# Patient Record
Sex: FEMALE | Race: WHITE | NOT HISPANIC OR LATINO | Employment: UNEMPLOYED | URBAN - METROPOLITAN AREA
[De-identification: names, ages, dates, MRNs, and addresses within clinical notes are randomized per-mention and may not be internally consistent; named-entity substitution may affect disease eponyms.]

---

## 2023-03-09 LAB
CHLAMYDIA TRACH., AMPLIFIED: NEGATIVE
N. GONORRHEA, AMPLIFIED: NEGATIVE

## 2023-03-10 LAB — URINE CULTURE: NORMAL

## 2023-05-05 LAB
ANTIBODY SCREEN: NORMAL
ERYTHROCYTE DISTRIBUTION WIDTH (RATIO) BY AUTOMATED COUNT: 12.5 % (ref 11.5–14.5)
ERYTHROCYTE MEAN CORPUSCULAR HEMOGLOBIN CONCENTRATION (G/DL) BY AUTOMATED: 32.7 G/DL (ref 32–36)
ERYTHROCYTE MEAN CORPUSCULAR VOLUME (FL) BY AUTOMATED COUNT: 100 FL (ref 80–100)
ERYTHROCYTES (10*6/UL) IN BLOOD BY AUTOMATED COUNT: 3.25 X10E12/L (ref 4–5.2)
GLUCOSE, 1 HR SCREEN, PREG: 117 MG/DL
HEMATOCRIT (%) IN BLOOD BY AUTOMATED COUNT: 32.4 % (ref 36–46)
HEMOGLOBIN (G/DL) IN BLOOD: 10.6 G/DL (ref 12–16)
LEUKOCYTES (10*3/UL) IN BLOOD BY AUTOMATED COUNT: 13.6 X10E9/L (ref 4.4–11.3)
PLATELETS (10*3/UL) IN BLOOD AUTOMATED COUNT: 365 X10E9/L (ref 150–450)
REFLEX ADDED, ANEMIA PANEL: ABNORMAL

## 2023-05-06 LAB
FERRITIN, PREGNANCY: 25 UG/L
IRON (UG/DL) IN SER/PLAS IN PREGNANCY: 78 UG/DL
IRON BINDING CAPACITY (UG/DL) IN PREGNANCY: 411 UG/DL
IRON SATURATION (%) IN PREGNANCY: 19 %

## 2023-05-07 LAB
FOLATE, SERUM, PREGNANCY: 12.7 NG/ML
VITAMIN B12, PREGNANCY: 361 PG/ML

## 2023-07-21 LAB — GROUP B STREP SCREEN: NORMAL

## 2025-05-23 ENCOUNTER — APPOINTMENT (OUTPATIENT)
Dept: OBSTETRICS AND GYNECOLOGY | Facility: CLINIC | Age: 25
End: 2025-05-23
Payer: COMMERCIAL

## 2025-05-23 VITALS — WEIGHT: 188 LBS | SYSTOLIC BLOOD PRESSURE: 118 MMHG | DIASTOLIC BLOOD PRESSURE: 80 MMHG | BODY MASS INDEX: 35.52 KG/M2

## 2025-05-23 DIAGNOSIS — Z34.80 SUPERVISION OF OTHER NORMAL PREGNANCY, ANTEPARTUM (HHS-HCC): Primary | ICD-10-CM

## 2025-05-23 PROBLEM — O16.9 ELEVATED BLOOD PRESSURE AFFECTING PREGNANCY, ANTEPARTUM: Status: RESOLVED | Noted: 2025-05-23 | Resolved: 2025-05-23

## 2025-05-23 PROCEDURE — 76815 OB US LIMITED FETUS(S): CPT | Performed by: OBSTETRICS & GYNECOLOGY

## 2025-05-23 PROCEDURE — 0500F INITIAL PRENATAL CARE VISIT: CPT | Performed by: OBSTETRICS & GYNECOLOGY

## 2025-05-23 ASSESSMENT — ENCOUNTER SYMPTOMS
NEUROLOGICAL NEGATIVE: 1
CONSTITUTIONAL NEGATIVE: 1
PSYCHIATRIC NEGATIVE: 1
CARDIOVASCULAR NEGATIVE: 1
GASTROINTESTINAL NEGATIVE: 1
RESPIRATORY NEGATIVE: 1

## 2025-05-23 NOTE — PROGRESS NOTES
Subjective   Patient ID: Korin Cervantes is a 25 y.o. female who presents for PREG CONF.  HPI    Patient presents for pregnancy confirmation. LMP 1/27/25. Reports positive home pregnancy test early March. Denies bleeding, pelvic pain, nausea/vomiting, change in discharge.    Review of Systems   Constitutional: Negative.    Respiratory: Negative.     Cardiovascular: Negative.    Gastrointestinal: Negative.    Genitourinary: Negative.    Neurological: Negative.    Psychiatric/Behavioral: Negative.         Objective   Visit Vitals  /80   Wt 85.3 kg (188 lb)   LMP 01/27/2025 (Exact Date)   BMI 35.52 kg/m²   OB Status Pregnant   Smoking Status Never   BSA 1.92 m²       Physical Exam  Constitutional:       Appearance: Normal appearance.   Pulmonary:      Effort: Pulmonary effort is normal.   Genitourinary:     Comments: Vulva normal in appearance without discoloration, rashes, lesions. Vagina is negative for blood, discharge, lesions. Uterus is small, mobile, non tender. There is no cervical motion tenderness, adnexal masses/tenderness.  Neurological:      Mental Status: She is alert.   Psychiatric:         Mood and Affect: Mood normal.         Behavior: Behavior normal.       Assessment/Plan   Problem List Items Addressed This Visit    None  Visit Diagnoses         Codes      Supervision of other normal pregnancy, antepartum ([facility])    -  Primary Z34.80    Relevant Orders    US OB limited 1+ fetuses (Completed)    Prenatal Screening Panel    Hemoglobin A1C    Comprehensive Metabolic Panel    Lactate Dehydrogenase - STAT    Uric Acid    Protein, Urine Random          Discussed findings on exam and US  [place] IUP confirmed, biometrics appropriate for LMP  SULEIMAN 11/3/25  Prenatal labs and baseline HELLP labs/UPCr ordered  Declines genetic screening  Advised PNV/ASA  Precautions given  RTO 4 weeks TANJAOB         Lorraine Rhoades,  05/23/25 10:52 AM

## 2025-05-23 NOTE — PATIENT INSTRUCTIONS
You were seen in the office today for confirmation of pregnancy. Your baby measured 16w4d on ultrasound and your due date is 11/3/25  Continue routine OB precautions at home  Continue taking prenatal vitamins. If you have not started prenatal vitamins you can start them now. Chewable and gummy prenatal vitamins are fine if it is difficult for you to swallow pills. Prenatal vitamins should have at least 800 mcg of Folic Acid to reduce the risk of neural tube/spinal defects in the baby. If you are currently greater than 10 weeks gestation, you should start two 81 mg Aspirin (baby Aspirin) daily to reduce the risk of elevated blood pressure/preeclampsia in the third trimester.   Routine prenatal lab work was ordered for you today and needs to be done prior to your next visit. These can be done at any  outpatient laboratory without an appointment, and do not require fasting.  Optional lab work to screen for genetic disorders can also be ordered. If these were discussed and/or ordered today the results will come separately from the rest of your routine labs, generally within 7-9 business days, and we will call you with these results. If you are still considering these labs we are happy to provide more information for review at home, and can answer any additional questions/order at the next office visit.  Avoid sick contacts and consider getting your Flu (available in office during flu season) and COVID vaccines to protect against infection in pregnancy  Make an appointment for a New OB visit in the office in the next 3-4 weeks  If you are having any concerns prior to your next visit please call the office to speak to the physician on call. This includes after hours, weekends, and holidays, when the answering service will be able to connect you with the physician on call. [phone] ([place] [facility]) or [phone] [facility].

## 2025-05-27 ENCOUNTER — LAB REQUISITION (OUTPATIENT)
Dept: LAB | Facility: HOSPITAL | Age: 25
End: 2025-05-27
Payer: COMMERCIAL

## 2025-05-27 ENCOUNTER — LAB (OUTPATIENT)
Dept: LAB | Facility: HOSPITAL | Age: 25
End: 2025-05-27
Payer: COMMERCIAL

## 2025-05-27 DIAGNOSIS — Z34.80 ENCOUNTER FOR SUPERVISION OF OTHER NORMAL PREGNANCY, UNSPECIFIED TRIMESTER (HHS-HCC): ICD-10-CM

## 2025-05-27 DIAGNOSIS — Z34.80 SUPERVISION OF OTHER NORMAL PREGNANCY, ANTEPARTUM (HHS-HCC): ICD-10-CM

## 2025-05-27 LAB
ABO GROUP (TYPE) IN BLOOD: NORMAL
ALBUMIN SERPL BCP-MCNC: 3.7 G/DL (ref 3.4–5)
ALP SERPL-CCNC: 55 U/L (ref 33–110)
ALT SERPL W P-5'-P-CCNC: 10 U/L (ref 7–45)
ANION GAP SERPL CALC-SCNC: 12 MMOL/L (ref 10–20)
ANTIBODY SCREEN: NORMAL
AST SERPL W P-5'-P-CCNC: 13 U/L (ref 9–39)
BILIRUB SERPL-MCNC: 0.7 MG/DL (ref 0–1.2)
BUN SERPL-MCNC: 8 MG/DL (ref 6–23)
CALCIUM SERPL-MCNC: 8.7 MG/DL (ref 8.6–10.3)
CHLORIDE SERPL-SCNC: 103 MMOL/L (ref 98–107)
CO2 SERPL-SCNC: 24 MMOL/L (ref 21–32)
CREAT SERPL-MCNC: 0.43 MG/DL (ref 0.5–1.05)
EGFRCR SERPLBLD CKD-EPI 2021: >90 ML/MIN/1.73M*2
ERYTHROCYTE [DISTWIDTH] IN BLOOD BY AUTOMATED COUNT: 13.7 % (ref 11.5–14.5)
GLUCOSE SERPL-MCNC: 88 MG/DL (ref 74–99)
HCT VFR BLD AUTO: 33.8 % (ref 36–46)
HGB BLD-MCNC: 11.1 G/DL (ref 12–16)
MCH RBC QN AUTO: 31.4 PG (ref 26–34)
MCHC RBC AUTO-ENTMCNC: 32.8 G/DL (ref 32–36)
MCV RBC AUTO: 96 FL (ref 80–100)
NRBC BLD-RTO: 0 /100 WBCS (ref 0–0)
PLATELET # BLD AUTO: 350 X10*3/UL (ref 150–450)
POTASSIUM SERPL-SCNC: 3.6 MMOL/L (ref 3.5–5.3)
PROT SERPL-MCNC: 6.1 G/DL (ref 6.4–8.2)
RBC # BLD AUTO: 3.53 X10*6/UL (ref 4–5.2)
RH FACTOR (ANTIGEN D): NORMAL
SODIUM SERPL-SCNC: 135 MMOL/L (ref 136–145)
WBC # BLD AUTO: 11.6 X10*3/UL (ref 4.4–11.3)

## 2025-05-27 PROCEDURE — 86780 TREPONEMA PALLIDUM: CPT | Mod: GEALAB | Performed by: OBSTETRICS & GYNECOLOGY

## 2025-05-27 PROCEDURE — 86803 HEPATITIS C AB TEST: CPT | Mod: GEALAB | Performed by: OBSTETRICS & GYNECOLOGY

## 2025-05-27 PROCEDURE — 86762 RUBELLA ANTIBODY: CPT | Mod: GEALAB | Performed by: OBSTETRICS & GYNECOLOGY

## 2025-05-27 PROCEDURE — 87389 HIV-1 AG W/HIV-1&-2 AB AG IA: CPT | Mod: GEALAB | Performed by: OBSTETRICS & GYNECOLOGY

## 2025-05-27 PROCEDURE — 87340 HEPATITIS B SURFACE AG IA: CPT | Mod: GEALAB | Performed by: OBSTETRICS & GYNECOLOGY

## 2025-05-27 PROCEDURE — 86901 BLOOD TYPING SEROLOGIC RH(D): CPT | Performed by: OBSTETRICS & GYNECOLOGY

## 2025-05-27 PROCEDURE — 80053 COMPREHEN METABOLIC PANEL: CPT

## 2025-05-27 PROCEDURE — 86850 RBC ANTIBODY SCREEN: CPT | Performed by: OBSTETRICS & GYNECOLOGY

## 2025-05-27 PROCEDURE — 85027 COMPLETE CBC AUTOMATED: CPT | Performed by: OBSTETRICS & GYNECOLOGY

## 2025-05-28 ENCOUNTER — APPOINTMENT (OUTPATIENT)
Dept: RADIOLOGY | Facility: HOSPITAL | Age: 25
End: 2025-05-28
Payer: COMMERCIAL

## 2025-05-28 ENCOUNTER — HOSPITAL ENCOUNTER (EMERGENCY)
Facility: HOSPITAL | Age: 25
Discharge: HOME | End: 2025-05-28
Payer: COMMERCIAL

## 2025-05-28 VITALS
HEART RATE: 93 BPM | WEIGHT: 190 LBS | OXYGEN SATURATION: 100 % | HEIGHT: 62 IN | SYSTOLIC BLOOD PRESSURE: 129 MMHG | RESPIRATION RATE: 16 BRPM | TEMPERATURE: 98.2 F | DIASTOLIC BLOOD PRESSURE: 84 MMHG | BODY MASS INDEX: 34.96 KG/M2

## 2025-05-28 DIAGNOSIS — M25.562 ACUTE PAIN OF LEFT KNEE: Primary | ICD-10-CM

## 2025-05-28 PROBLEM — Z87.59 HISTORY OF GESTATIONAL HYPERTENSION: Status: ACTIVE | Noted: 2025-05-28

## 2025-05-28 PROBLEM — Z28.39 RUBELLA NON-IMMUNE STATUS, ANTEPARTUM (HHS-HCC): Status: ACTIVE | Noted: 2025-05-28

## 2025-05-28 PROBLEM — O09.899 RUBELLA NON-IMMUNE STATUS, ANTEPARTUM (HHS-HCC): Status: ACTIVE | Noted: 2025-05-28

## 2025-05-28 PROBLEM — Z34.80 SUPERVISION OF OTHER NORMAL PREGNANCY, ANTEPARTUM (HHS-HCC): Status: ACTIVE | Noted: 2025-05-28

## 2025-05-28 LAB
EST. AVERAGE GLUCOSE BLD GHB EST-MCNC: 108 MG/DL
EST. AVERAGE GLUCOSE BLD GHB EST-SCNC: 6 MMOL/L
HBA1C MFR BLD: 5.4 %
HBV SURFACE AG SERPL QL IA: NONREACTIVE
HCV AB SER QL: NONREACTIVE
HIV 1+2 AB+HIV1 P24 AG SERPL QL IA: NONREACTIVE
LDH SERPL P TO L-CCNC: 131 U/L (ref 100–200)
REFLEX ADDED, ANEMIA PANEL: NORMAL
RUBV IGG SERPL IA-ACNC: 0.4 IA
RUBV IGG SERPL QL IA: NEGATIVE
TREPONEMA PALLIDUM IGG+IGM AB [PRESENCE] IN SERUM OR PLASMA BY IMMUNOASSAY: NONREACTIVE
URATE SERPL-MCNC: 3 MG/DL (ref 2.5–7)

## 2025-05-28 PROCEDURE — 93971 EXTREMITY STUDY: CPT | Performed by: RADIOLOGY

## 2025-05-28 PROCEDURE — 93971 EXTREMITY STUDY: CPT

## 2025-05-28 PROCEDURE — 99284 EMERGENCY DEPT VISIT MOD MDM: CPT | Mod: 25

## 2025-05-28 ASSESSMENT — PAIN SCALES - GENERAL: PAINLEVEL_OUTOF10: 2

## 2025-05-28 ASSESSMENT — PAIN - FUNCTIONAL ASSESSMENT: PAIN_FUNCTIONAL_ASSESSMENT: 0-10

## 2025-05-28 ASSESSMENT — PAIN DESCRIPTION - ORIENTATION: ORIENTATION: LEFT

## 2025-05-28 ASSESSMENT — LIFESTYLE VARIABLES
EVER FELT BAD OR GUILTY ABOUT YOUR DRINKING: NO
HAVE YOU EVER FELT YOU SHOULD CUT DOWN ON YOUR DRINKING: NO
TOTAL SCORE: 0
HAVE PEOPLE ANNOYED YOU BY CRITICIZING YOUR DRINKING: NO
EVER HAD A DRINK FIRST THING IN THE MORNING TO STEADY YOUR NERVES TO GET RID OF A HANGOVER: NO

## 2025-05-28 ASSESSMENT — COLUMBIA-SUICIDE SEVERITY RATING SCALE - C-SSRS
1. IN THE PAST MONTH, HAVE YOU WISHED YOU WERE DEAD OR WISHED YOU COULD GO TO SLEEP AND NOT WAKE UP?: NO
6. HAVE YOU EVER DONE ANYTHING, STARTED TO DO ANYTHING, OR PREPARED TO DO ANYTHING TO END YOUR LIFE?: NO
2. HAVE YOU ACTUALLY HAD ANY THOUGHTS OF KILLING YOURSELF?: NO

## 2025-05-28 ASSESSMENT — PAIN DESCRIPTION - PAIN TYPE: TYPE: ACUTE PAIN

## 2025-05-28 ASSESSMENT — PAIN DESCRIPTION - LOCATION: LOCATION: KNEE

## 2025-05-29 NOTE — ED PROVIDER NOTES
HPI   Chief Complaint   Patient presents with    Leg Swelling     Pt has pain and swelling above Lt knee. On Saturday she had pain in Lt calf.       CC: Left lower extremity pain  HPI:   25-year-old female G2, P1 who presents to the ED complaining of left knee pain left posterior knee pain and calf tenderness that started approximately 4 days ago.  She does note that this weekend she was on her knees cleaning, she denies any other significant mechanism of injury.  Patient  is approximately 6 weeks pregnant.  Denies having any chest pain, shortness of breath denies any headache, dizziness or cervical neck tenderness.  No fever, chills.    Additional Limitations to History:   External Records Reviewed: I reviewed recent and relevant outside records including   History Obtained From:     Past Medical History: Per HPI  Medications: Reviewed in EMR and with patient  Allergies:  Reviewed in EMR  Past Surgical History:   Social History:     ------------------------------------------------------------------------------------------------------  Physical Exam:  --Vital signs reviewed in nursing triage note, EMR flow sheets, and at patient's bedside  GEN:  A&Ox3, no acute distress, appears comfortable.  Conversational and appropriate.  No confusion or gross mental status changes.  EYES: EOMI, non-injected sclera.  ENT: Moist mucous membranes, no apparent injuries or lesions.   CARDIO: Normal rate and regular rhythm. No murmurs, rubs, or gallops.  2+ equal pulses of the distal extremities.   PULM: Clear to auscultation bilaterally. No rales, rhonchi, or wheezes. Good symmetric chest expansion.  GI: Soft, non-tender, non-distended. No rebound tenderness or guarding.  SKIN: Warm and dry, no rashes or lesions.  MSK: ROM intact the extremities without contractures.   EXT: Knee injury:  Lower extremity: There is no tenderness over the medial joint line. There is no tenderness over the lateral joint line. The extensor mechanism is  intact. There is no obvious laxity. The remainder of the extremity, specifically, the tib-fib, ankle, and foot are nontender. Skin is intact. Is neurovascularly intact distally. There is no evidence of an intra-articular infection. Compartments are soft to palpation.   NEURO: Cranial nerves II-XII grossly intact. Sensation to light touch intact and equal bilaterally in upper and lower extremities.  Symmetric 5/5 strength in upper and lower extremities.  PSYCH: Appropriate mood and behavior, converses and responds appropriately during exam.  -------------------------------------------------------------------------------------------------------      Differential Diagnoses Considered:   Chronic Medical Conditions Significantly Affecting Care:   Diagnostic testing considered: [PERC, D-Dimer, PECARN, etc.]      - I independently interpreted: [CXR, CT, POCUS, etc. including your interpretation]  - Labs notable for     Escalation of Care: Appropriate for   Social Determinants of Health Significantly Affecting Care: [Homelessness, lacking transportation, uninsured, unable to afford medications]  Prescription Drug Consideration: [Antibiotics, antivirals, pain medications, etc.]  Discussion of Management with Other Providers:  I discussed the patient/results with: [admitting team, consultant, radiologist, social work, EPAT, case management, PT/OT, RT, PCP, etc.]      Manfred Perez PA-C              Patient History   Medical History[1]  Surgical History[2]  Family History[3]  Social History[4]    Physical Exam   ED Triage Vitals [05/28/25 2121]   Temperature Heart Rate Respirations BP   36.8 °C (98.2 °F) (!) 104 18 138/87      Pulse Ox Temp Source Heart Rate Source Patient Position   100 % Temporal Monitor --      BP Location FiO2 (%)     -- --       Physical Exam      ED Course & MDM   Diagnoses as of 05/29/25 1425   Acute pain of left knee                 No data recorded     [place] Coma Scale Score: 15 (05/28/25 2127 :  Jeannette Garrido RN)                           Medical Decision Making  25-year-old Darryl female with acute left knee pain likely musculoskeletal or strain negative DVT workup there is no evidence suggesting septic joint or secondary soft tissue infection extremity is neurovascular intact distally, advised conservative management at this time reassured patient and she will follow-up with her OB/GYN for further OB care patient understands instructions if she continues to develop any pain in the left lower extremity or if she notices any redness warmth decreased sensation to return to ED immediately.        Procedure  Procedures       Manfred Perez PA-C  05/28/25 5711         Manfred Perez PA-C  05/28/25 6586       [1]   Past Medical History:  Diagnosis Date    Elevated blood pressure affecting pregnancy, antepartum 05/23/2025    Personal history of other diseases of the respiratory system 03/16/2015    History of acute pharyngitis   [2] History reviewed. No pertinent surgical history.  [3] No family history on file.  [4]   Social History  Tobacco Use    Smoking status: Never    Smokeless tobacco: Never   Vaping Use    Vaping status: Never Used   Substance Use Topics    Alcohol use: Never    Drug use: Never        Manfred Perez PA-C  05/29/25 3867

## 2025-06-20 ENCOUNTER — APPOINTMENT (OUTPATIENT)
Dept: OBSTETRICS AND GYNECOLOGY | Facility: CLINIC | Age: 25
End: 2025-06-20
Payer: COMMERCIAL

## 2025-06-20 VITALS — BODY MASS INDEX: 34.75 KG/M2 | SYSTOLIC BLOOD PRESSURE: 110 MMHG | DIASTOLIC BLOOD PRESSURE: 68 MMHG | WEIGHT: 190 LBS

## 2025-06-20 DIAGNOSIS — Z87.59 HISTORY OF GESTATIONAL HYPERTENSION: ICD-10-CM

## 2025-06-20 DIAGNOSIS — Z3A.20 20 WEEKS GESTATION OF PREGNANCY (HHS-HCC): Primary | ICD-10-CM

## 2025-06-20 PROBLEM — O44.40 LOW-LYING PLACENTA (HHS-HCC): Status: ACTIVE | Noted: 2025-06-20

## 2025-06-20 PROBLEM — O99.210 MATERNAL OBESITY SYNDROME, ANTEPARTUM (HHS-HCC): Status: ACTIVE | Noted: 2025-06-20

## 2025-06-20 NOTE — PROGRESS NOTES
Subjective   Patient ID [id number]   Korin Cervantes is a 25 y.o.  at 20w4d with a working estimated date of delivery of 11/3/2025, by Last Menstrual Period who presents for an initial prenatal visit. This pregnancy is planned.    Her pregnancy is complicated by:  Obesity  Hx GHTN  Rubella non immune    OB History    Para Term  AB Living   2 1 1 0 0 1   SAB IAB Ectopic Multiple Live Births   0 0 0 0 1      # Outcome Date GA Lbr Paresh/2nd Weight Sex Type Anes PTL Lv   2 Current            1 Term 23 39w0d  3.657 kg M Vag-Spont   KRISTEN          Objective   Physical Exam  Weight: 86.2 kg (190 lb)  Expected Total Weight Gain: 5 kg (11 lb)-9 kg (19 lb)   Pregravid BMI: 34.38  BP: 110/68    Fetal Heart Rate: +US     Physical Exam  Constitutional:       Appearance: Normal appearance.   Genitourinary:      Genitourinary Comments: Vulva normal in appearance without discoloration, rashes, lesions. Vagina is negative for blood, discharge, lesions.      Cardiovascular:      Rate and Rhythm: Regular rhythm.   Pulmonary:      Breath sounds: Normal breath sounds.   Chest:      Chest wall: No mass.   Abdominal:      General: Abdomen is flat.      Palpations: Abdomen is soft.   Musculoskeletal:      Cervical back: Normal range of motion and neck supple.   Lymphadenopathy:      Upper Body:      Right upper body: No supraclavicular, axillary or pectoral adenopathy.      Left upper body: No supraclavicular, axillary or pectoral adenopathy.   Neurological:      General: No focal deficit present.      Mental Status: She is alert and oriented to person, place, and time.   Skin:     General: Skin is warm and dry.   Psychiatric:         Mood and Affect: Mood normal.         Behavior: Behavior normal.       Prenatal Labs  WNL except Rubella    Assessment/Plan   Problem List Items Addressed This Visit           ICD-10-CM    20 weeks gestation of pregnancy ([facility]) - Primary Z3A.20    Relevant Orders    US OB limited 1+  fetuses    Urine culture    C. trachomatis / N. gonorrhoeae, Amplified, Urogenital    History of gestational hypertension Z87.59    Relevant Orders    Protein, Urine Random     Medical Problems       Problem List       20 weeks gestation of pregnancy ([facility])    Overview Addendum 2025 11:56 AM by Lorraine Rhoades DO       1st Trimester  [x]  OB profile/lab work 25 WNL, RUBELLA NON IMMUNE, BLOOD TYPE IS A- (NEEDS RHOGAM)  []  Pap NIL   []  GC DNA probe PENDING  []  Urine culture PENDING  [x]  Early A1c (BMI 30+)  25 5.4%  []  Aneuploidy screening (Prequel 10+ wk/First Trimester Check 11-14 wk) DECLINES  []  Carrier screening DECLINES  []  First trimester anatomy US/NT DECLINES    2nd Trimester  [x]  Anatomy US (19-21 wks) biometrics appropriate, posterior low lying placenta, grossly normal anatomy/fluid  []  1 hour Glucose (25-28 wks)  []  CBC (25-28 wks)  []  Type/Screen (25-28 weeks if Rh neg) A-  []  3 hour GTT (if needed)  []  Rhogam (28 weeks if needed) A-    3rd Trimester  []  GBS (36-37 wks)  []  L&D consent  []  TOLAC consent (if needed)  []  Medicaid salpingectomy consent    Vaccines  []  COVID ADVISED  []  Flu (September to May) ADVISED  []  Tdap (27-36 wks) ADVISED  []  RSV (32-36 wks Sept-) ADVISED             History of gestational hypertension    Overview Addendum 2025 11:30 AM by Lorraine Rhoades DO   Patient with diagnosis of GHTN in G1 pregnancy   [x]  Baseline HELLP labs WNL  []  Baseline UPCr PENDING   []   mg/day starting at 12 weeks GA           Rubella non-immune status, antepartum ([facility])    Overview Signed 2025  5:28 PM by Lorraine Rhoades DO   Advised MMR postpartum         Maternal obesity syndrome, antepartum ([facility])    Overview Signed 2025 11:38 AM by Lorraine Rhoades DO   [x]  BMI at ENOB 34  [x]  Baseline A1c 5.4%  []  NST: weekly at 34 weeks (BMI 40+ at first visit)/36 weeks (BMI 35-39.9 at first  visit)  []  Growth US 30 and 36 wks  If BMI 50+ at any point in pregnancy, delivery at [facility]  Delivery recommended 39w0d to 39w6d             Low-lying placenta ([facility])    Overview Signed 6/20/2025 11:56 AM by Lorraine Rhoades DO   Noted on 20 week US. Posterior, no prior LTCS. Repeat US at 30 weeks to assess position               Immunizations: Advised Tdap, RSV, COVID, Flu; MMR postpartum  Prenatal Labs wnl except Rubella  Daily prenatal vitamins prescribed  First trimester screening and second trimester screening discussed. Patient decided to decline  Follow up in 4 weeks for return OB visit.  Lorraine Rhoades DO

## 2025-06-20 NOTE — PATIENT INSTRUCTIONS
Welcome to prenatal care with [facility]!  You were seen in the office today for your initiation to prenatal care and had normal findings on exam  Continue routine OB precautions at home  Your labs were reviewed today and were normal except you do not have immunity to Rubella and should consider a MMR booster vaccine postpartum. Routine pelvic cultures, urine culture, and pap smear (if you were due) were done today and you will be notified of the results  If you have had genetic screening labs done, we usually receive the results from [facility] within 7-9 business days and we will call you with the results.   Continue taking prenatal vitamins, it is also recommended to start two 81 mg (baby) Aspirin daily after 10 weeks. This reduces the risk of blood pressure elevations/preeclampsia in the third trimester  Avoid sick contacts and consider getting your Flu (available in office during flu season) and COVID vaccines to protect against infection in pregnancy    What to expect:  -    You will see each of our physicians at least once during your prenatal care. There are 5 physicians (Bettie Jordan, Lawson, Tod, Hernandez, and Magan) and our NP Anay Davis. We rotate OB call to be able to provide the best care to our patients during this important time, and we want to make sure you have had a chance to meet each physician prior to coming in for labor.   -    We will see you in the office every 4 weeks in the first two trimesters, every 2 weeks between 30 and 36 weeks gestation, and weekly following 36 weeks. Anatomy ultrasounds are done at [facility] around 20 weeks, gestational diabetes and anemia screening is done through outpatient labs between 25 and 28 weeks gestation, and labor and delivery preparations (ultrasound to confirm presentation, consent forms, etc) are done at 36 weeks in the office.   -    Visits can seem quick, but provide us with quite a bit of important information. So it is important to try not to  miss any visits if possible and make up any cancelled appointments as soon as possible. Make sure you come to each visit with a full bladder because urine testing for bacteria, glucose, and protein are done at each visit. And although the OB visits may seem quick, we are happy to take the time to answer any questions or discuss any concerns you may have  -    We deliver at [facility]: [address] [place], [address]  -    Birth, lactation, and parenting classes, as well as scheduling for hospital tours can be done through [email or web address].       Make an appointment for routine care in the office in the next 4 weeks  If you are having any bleeding, pain, severe nausea and/or vomiting, or any other concerns prior to your next visit, please call the office to speak to the physician on call. This includes after hours, weekends, and holidays, when the answering service will be able to connect you with the physician on call. [phone] ([place] [facility]) or [phone] ([place] [facility]).    Congratulations, we are excited to partner with you in the care of your pregnancy!

## 2025-06-22 LAB
BACTERIA UR CULT: NORMAL
C TRACH RRNA SPEC QL NAA+PROBE: NOT DETECTED
CREAT UR-MCNC: NORMAL MG/DL
N GONORRHOEA RRNA SPEC QL NAA+PROBE: NOT DETECTED
PROT UR-MCNC: NORMAL G/DL
PROT/CREAT UR: NORMAL MG/G{CREAT}
PROT/CREAT UR: NORMAL MG/G{CREAT}
QUEST GC CT AMPLIFIED (ALWAYS MESSAGE): NORMAL

## 2025-06-23 LAB
BACTERIA UR CULT: NORMAL
C TRACH RRNA SPEC QL NAA+PROBE: NOT DETECTED
CREAT UR-MCNC: 113 MG/DL (ref 20–275)
N GONORRHOEA RRNA SPEC QL NAA+PROBE: NOT DETECTED
PROT UR-MCNC: 20 MG/DL (ref 5–24)
PROT/CREAT UR: 0.18 MG/MG CREAT (ref 0.02–0.18)
PROT/CREAT UR: 177 MG/G CREAT (ref 24–184)
QUEST GC CT AMPLIFIED (ALWAYS MESSAGE): NORMAL

## 2025-07-14 PROBLEM — Z3A.24 24 WEEKS GESTATION OF PREGNANCY (HHS-HCC): Status: ACTIVE | Noted: 2025-05-28

## 2025-07-16 NOTE — PROGRESS NOTES
Ob Visit  25     SUBJECTIVE      HPI: Korin Cervantes is a 25 y.o.  at 24w3d here for RPNV.    She has no contractions, no bleeding, or no LOF.   Reports normal fetal movement.       Her pregnancy is complicated by:  Pregravid BMI 34.38  Hx GHTN  Rubella non immune  Rh negative       OBJECTIVE  Objective   Physical Exam  Weight: 87.1 kg (192 lb)  BP: 120/82  Fetal Heart Rate: 140 Fundal Height (cm): 24 cm    Lab Results   Component Value Date    HGB 11.1 (L) 2025    HCT 33.8 (L) 2025         ASSESSMENT & PLAN    Korin Cervantes is a 25 y.o.  at 24w3d here for the following concerns we addressed today:    Problem List Items Addressed This Visit       24 weeks gestation of pregnancy ([facility])    Overview       1st Trimester  [x]  OB profile/lab work 25 WNL, RUBELLA NON IMMUNE, BLOOD TYPE IS A- (NEEDS RHOGAM)  [x]  Pap 3-8-23 NEG  [x]  GC DNA probe 25 NEG/NEG  [x]  Urine culture 25 NEG  [x]  Early A1c (BMI 30+)  25 5.4%  []  Aneuploidy screening (Prequel 10+ wk/First Trimester Check 11-14 wk) DECLINES  []  Carrier screening DECLINES  []  First trimester anatomy US/NT DECLINES    2nd Trimester  [x]  Anatomy US (19-21 wks) biometrics appropriate, posterior low lying placenta, grossly normal anatomy/fluid  []  1 hour Glucose (25-28 wks)  []  CBC (25-28 wks)  []  Type/Screen (25-28 weeks if Rh neg) A-  []  3 hour GTT (if needed)  []  Rhogam (28 weeks if needed) A-    3rd Trimester  []  GBS (36-37 wks)  []  L&D consent  []  TOLAC consent (if needed)  []  Medicaid salpingectomy consent    Vaccines  []  COVID ADVISED  []  Flu (September to May) ADVISED  []  Tdap (27-36 wks) ADVISED  []  RSV (32-36 wks Sept-) ADVISED             Relevant Orders    POCT UA (nonautomated) manually resulted (Completed)     Other Visit Diagnoses         Supervision of other normal pregnancy, antepartum ([facility])        Relevant Orders    Glucose, 1 Hour Screen, Pregnancy    CBC    Type And  Screen Is this order related to pregnancy or an upcoming surgery? Yes; Where will this surgery/delivery be performed? [facility]; What is the date of the surgery? 11/3/2025; Has this patient ever had a transfusion? Unknown; Has...      Rh negative state in antepartum period ([facility])        Relevant Orders    Type And Screen Is this order related to pregnancy or an upcoming surgery? Yes; Where will this surgery/delivery be performed? [facility]; What is the date of the surgery? 11/3/2025; Has this patient ever had a transfusion? Unknown; Has...        Rhogam 28 weeks.    Glucola order given.  Check placenta next visit.  RTC in 4 weeks  Will check if she is taking JEREMIAH Pathak MD

## 2025-07-17 ENCOUNTER — APPOINTMENT (OUTPATIENT)
Dept: OBSTETRICS AND GYNECOLOGY | Facility: CLINIC | Age: 25
End: 2025-07-17
Payer: COMMERCIAL

## 2025-07-17 VITALS — WEIGHT: 192 LBS | BODY MASS INDEX: 35.12 KG/M2 | SYSTOLIC BLOOD PRESSURE: 120 MMHG | DIASTOLIC BLOOD PRESSURE: 82 MMHG

## 2025-07-17 DIAGNOSIS — Z34.80 SUPERVISION OF OTHER NORMAL PREGNANCY, ANTEPARTUM (HHS-HCC): ICD-10-CM

## 2025-07-17 DIAGNOSIS — Z3A.24 24 WEEKS GESTATION OF PREGNANCY (HHS-HCC): ICD-10-CM

## 2025-07-17 DIAGNOSIS — Z67.91 RH NEGATIVE STATE IN ANTEPARTUM PERIOD (HHS-HCC): ICD-10-CM

## 2025-07-17 DIAGNOSIS — O26.899 RH NEGATIVE STATE IN ANTEPARTUM PERIOD (HHS-HCC): ICD-10-CM

## 2025-07-17 LAB
POC BLOOD, URINE: NEGATIVE
POC GLUCOSE, URINE: NEGATIVE MG/DL
POC KETONES, URINE: NEGATIVE MG/DL
POC LEUKOCYTES, URINE: NEGATIVE
POC NITRITE,URINE: NEGATIVE
POC PROTEIN, URINE: NEGATIVE MG/DL

## 2025-07-17 PROCEDURE — 0501F PRENATAL FLOW SHEET: CPT | Performed by: OBSTETRICS & GYNECOLOGY

## 2025-07-17 RX ORDER — CHLORHEXIDINE GLUCONATE ORAL RINSE 1.2 MG/ML
SOLUTION DENTAL
COMMUNITY
Start: 2025-07-10

## 2025-07-18 ENCOUNTER — TELEPHONE (OUTPATIENT)
Dept: OBSTETRICS AND GYNECOLOGY | Facility: CLINIC | Age: 25
End: 2025-07-18
Payer: COMMERCIAL

## 2025-08-07 ENCOUNTER — LAB (OUTPATIENT)
Dept: LAB | Facility: HOSPITAL | Age: 25
End: 2025-08-07
Payer: COMMERCIAL

## 2025-08-07 LAB
ABO GROUP (TYPE) IN BLOOD: NORMAL
ANTIBODY SCREEN: NORMAL
RH FACTOR (ANTIGEN D): NORMAL

## 2025-08-07 PROCEDURE — 86901 BLOOD TYPING SEROLOGIC RH(D): CPT

## 2025-08-07 PROCEDURE — 86850 RBC ANTIBODY SCREEN: CPT

## 2025-08-07 PROCEDURE — 86900 BLOOD TYPING SEROLOGIC ABO: CPT

## 2025-08-08 LAB
ERYTHROCYTE [DISTWIDTH] IN BLOOD BY AUTOMATED COUNT: 13.1 % (ref 11–15)
GLUCOSE 1H P 50 G GLC PO SERPL-MCNC: 129 MG/DL
HCT VFR BLD AUTO: 29.2 % (ref 35–45)
HGB BLD-MCNC: 9.4 G/DL (ref 11.7–15.5)
MCH RBC QN AUTO: 31.2 PG (ref 27–33)
MCHC RBC AUTO-ENTMCNC: 32.2 G/DL (ref 32–36)
MCV RBC AUTO: 97 FL (ref 80–100)
PLATELET # BLD AUTO: 383 THOUSAND/UL (ref 140–400)
PMV BLD REES-ECKER: 9.9 FL (ref 7.5–12.5)
RBC # BLD AUTO: 3.01 MILLION/UL (ref 3.8–5.1)
WBC # BLD AUTO: 10.8 THOUSAND/UL (ref 3.8–10.8)

## 2025-08-13 PROBLEM — Z3A.28 28 WEEKS GESTATION OF PREGNANCY (HHS-HCC): Status: ACTIVE | Noted: 2025-05-28

## 2025-08-14 ENCOUNTER — APPOINTMENT (OUTPATIENT)
Dept: OBSTETRICS AND GYNECOLOGY | Facility: CLINIC | Age: 25
End: 2025-08-14
Payer: COMMERCIAL

## 2025-08-14 VITALS — SYSTOLIC BLOOD PRESSURE: 116 MMHG | WEIGHT: 192 LBS | DIASTOLIC BLOOD PRESSURE: 70 MMHG | BODY MASS INDEX: 35.12 KG/M2

## 2025-08-14 DIAGNOSIS — O99.210 MATERNAL OBESITY SYNDROME, ANTEPARTUM (HHS-HCC): ICD-10-CM

## 2025-08-14 DIAGNOSIS — O44.40 LOW-LYING PLACENTA (HHS-HCC): ICD-10-CM

## 2025-08-14 DIAGNOSIS — Z67.91 RH NEGATIVE STATE IN ANTEPARTUM PERIOD (HHS-HCC): ICD-10-CM

## 2025-08-14 DIAGNOSIS — O26.899 RH NEGATIVE STATE IN ANTEPARTUM PERIOD (HHS-HCC): ICD-10-CM

## 2025-08-14 DIAGNOSIS — Z3A.28 28 WEEKS GESTATION OF PREGNANCY (HHS-HCC): Primary | ICD-10-CM

## 2025-08-14 LAB
POC BLOOD, URINE: NEGATIVE
POC GLUCOSE, URINE: NEGATIVE MG/DL
POC KETONES, URINE: NEGATIVE MG/DL
POC LEUKOCYTES, URINE: ABNORMAL
POC NITRITE,URINE: NEGATIVE
POC PROTEIN, URINE: ABNORMAL MG/DL

## 2025-08-14 PROCEDURE — 0501F PRENATAL FLOW SHEET: CPT | Performed by: OBSTETRICS & GYNECOLOGY

## 2025-08-14 PROCEDURE — 96372 THER/PROPH/DIAG INJ SC/IM: CPT | Performed by: OBSTETRICS & GYNECOLOGY

## 2025-08-14 RX ORDER — ASPIRIN 81 MG/1
81 TABLET ORAL DAILY
COMMUNITY

## 2025-09-03 ENCOUNTER — APPOINTMENT (OUTPATIENT)
Dept: OBSTETRICS AND GYNECOLOGY | Facility: CLINIC | Age: 25
End: 2025-09-03
Payer: COMMERCIAL

## 2025-09-03 PROBLEM — Z3A.31 31 WEEKS GESTATION OF PREGNANCY (HHS-HCC): Status: ACTIVE | Noted: 2025-05-28

## 2025-09-03 LAB
ERYTHROCYTE [DISTWIDTH] IN BLOOD BY AUTOMATED COUNT: 14.6 % (ref 11.5–14.5)
HCT VFR BLD AUTO: 31.6 % (ref 36–46)
HGB BLD-MCNC: 10.2 G/DL (ref 12–16)
MCH RBC QN AUTO: 31.9 PG (ref 26–34)
MCHC RBC AUTO-ENTMCNC: 32.3 G/DL (ref 32–36)
MCV RBC AUTO: 99 FL (ref 80–100)
NRBC BLD-RTO: 0 /100 WBCS (ref 0–0)
PLATELET # BLD AUTO: 380 X10*3/UL (ref 150–450)
RBC # BLD AUTO: 3.2 X10*6/UL (ref 4–5.2)
WBC # BLD AUTO: 11.9 X10*3/UL (ref 4.4–11.3)

## 2025-09-04 ENCOUNTER — TELEPHONE (OUTPATIENT)
Dept: OBSTETRICS AND GYNECOLOGY | Facility: CLINIC | Age: 25
End: 2025-09-04
Payer: COMMERCIAL

## 2025-09-04 LAB
RETICS #: ABNORMAL CELLS/UL (ref 20000–80000)
RETICS/RBC NFR AUTO: 2.6 %

## 2025-09-05 PROBLEM — O99.013 ANEMIA DURING PREGNANCY IN THIRD TRIMESTER: Status: ACTIVE | Noted: 2025-09-05

## 2025-09-17 ENCOUNTER — APPOINTMENT (OUTPATIENT)
Dept: OBSTETRICS AND GYNECOLOGY | Facility: CLINIC | Age: 25
End: 2025-09-17
Payer: COMMERCIAL